# Patient Record
Sex: FEMALE | Race: WHITE | Employment: UNEMPLOYED | URBAN - METROPOLITAN AREA
[De-identification: names, ages, dates, MRNs, and addresses within clinical notes are randomized per-mention and may not be internally consistent; named-entity substitution may affect disease eponyms.]

---

## 2023-12-29 DIAGNOSIS — D32.9 MENINGIOMA (MULTI): ICD-10-CM

## 2024-01-26 ENCOUNTER — APPOINTMENT (OUTPATIENT)
Dept: HEMATOLOGY/ONCOLOGY | Facility: HOSPITAL | Age: 54
End: 2024-01-26
Payer: COMMERCIAL

## 2024-03-01 ENCOUNTER — TELEMEDICINE (OUTPATIENT)
Dept: HEMATOLOGY/ONCOLOGY | Facility: HOSPITAL | Age: 54
End: 2024-03-01
Payer: COMMERCIAL

## 2024-03-01 DIAGNOSIS — D32.9 MENINGIOMA (MULTI): ICD-10-CM

## 2024-03-01 PROCEDURE — 99215 OFFICE O/P EST HI 40 MIN: CPT | Performed by: PSYCHIATRY & NEUROLOGY

## 2024-03-01 NOTE — PROGRESS NOTES
Patient Visit Information:   Visit Type: New Visit      Cancer History:   Treatment Synopsis:       History of a meningioma diagnosed in 2007; surgery in 2020.         History of Present Illness:      ID Statement:    ETHEL PATE is a 53 year old Female        Chief Complaint: History of meningioma, diagnosed  2007, with surgery 2020.   Interval History:       Ethel Pate is a 52 yo RH  woman with a PMH significant for Multiple Sclerosis, who is here for a Return to Diving evaluation for the [facility]. She has a history of Multiple Sclerosis for many years (diagnosed in the 1990's),  and has been on various neuro-immunotherapy regimens and clinical trials. During routine MRI studies for the condition, a small tumor was discovered in the posterior right lateral ventricle in 2007, measuring roughly 3.0 cm. Since it was small and incidental,  and in a deep location, the local Neurosurgeon recommended it be followed with yearly imaging. The tumor remained very stable until an MRI in 2017 suggested some initial growth of the mass. It was then followed with more frequent scans (q3-4 months),  and had further mild growth in 2019/2020. So she was taken to the OR on 12/18/2020 for a gross total resection of the tumor, without any complications except for a persistent homonymous hemianopsia on the left side and some peritumoral ischemic changes.  The pathology was a WHO grade I meningioma. She went to rehab for 3 weeks before going home. She never had any seizures pre-op, during the surgery, or since then.      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 She has never had any complications from the diving, including DCS, DCI, or AGE. Her last diving was in 2018 when she was on the [place] [employer]. She has not done any diving since then, but is interested in trying to dive again in the future.      INTERVAL HISTORY (3/01/2024): Since the last  visit, she has been doing some pool diving with her local dive shop. She has been down < 10 feet for 2 dives so far, for > 90 minutes with minimal symptoms -- mainly trouble clearing her ears, slight headache.      Outpatient Medication Profile:  * Outpatient Medication Status not yet specified     Social History:   Social Substance History:  ·  Smoking Status never smoker   ·  Tobacco Use denies   ·  Alcohol Use occasionally   ·  Drug Use denies      Physical Exam:      Neurological: A&Ox3, speech fluent without dysnomia,  intact STM and cognition, loss of VF to the left, EOMI, PERRL, CN's II - XII otherwise intact, strength 5/5 in UE/LE, gait intact, DTR's 2-2+, sensation intact.         Radiology Result:     ·  Results           The local MRI scans are now uploaded into her chart, and show the tumor located in the right posterior atrium of the lateral ventricle, measuring close to 3.0 cm in size, with no edema in the nearby brain parenchyma. The post-op MRI scans from 2020 reveal  a GTR of the mass, with loss of brain tissue on the path to the tumor. On recent MRI and CT scans there was no hemorrhage or air in or near the resection cavity.            Assessment and Plan:      Assessment and Plan:   Assessment:       Ethel Pate has a history of Multiple Sclerosis and the recent resection of a meningioma, with questions about returning to scuba diving.         Plan:       -Ethel has a history of Multiple Sclerosis and a longstanding right sided meningioma that was resected back in late 2020.      -Other than a chronic visual field deficit, she is doing well and is neurologically intact and non-focal. She has NEVER HAD A SEIZURE.      -The CT and MRI scans were reviewed and showed a GTR of the tumor, with clean margins and no residual high signal/enhancement. NO AIR.       -By the end of the year it will be > 3 years since her brain tumor surgery.      -As planned, she has now started doing some limited  "shallow pool diving, and has been re-adjusting to breathing compressed air at depth just fine so far.      -We will now advance the depths to 15 feet for upcoming pool dives in March and April.     -She is also going to talk to the [employer] about some possible open water diving in the summer, which she would do in tandem with a Dive Instructor.      -She knows to call us if there are any new symptoms or issues when she is at depth during the pool sessions.      -She is to continue routine follow-up for the meningioma with her local Neurosurgeon.      -She is to continue routine follow-up for the Multiple Sclerosis with her local Neurologist.      -I spent > 30 minutes in Televideo consultation to review and discuss the above; 50% of which or more was dedicated to counseling.         Brian Dinh MD, DMO  PADI Instructor in Dive Medicine  [facility] Diving Medical Officer        Note Recipients: Unknown, Pcp, MD   Select \"Yes\" when ready to send to Provider(s) Listed Above:  Note sent to providers named above         Electronic Signatures:  Brian Dinh)  (Signed 01-Mar-2024 0910 am)            Authored: Patient Visit Information, Cancer History,  History of Present Illness, Allergies and Outpatient Medication Profile, Problem List, Social History, Physical Exam, Results, Assessment and Plan, To Send Document via Auto Fax, Attestation      "

## 2024-04-22 ENCOUNTER — TELEPHONE (OUTPATIENT)
Dept: HEMATOLOGY/ONCOLOGY | Facility: HOSPITAL | Age: 54
End: 2024-04-22
Payer: COMMERCIAL

## 2024-04-22 NOTE — TELEPHONE ENCOUNTER
Informed Ethel that Dr. Dinh would like her to continue her shallow dives in the pool and that she can go diving in [facility] under specific parameters:  -limit dive time to 45-60 mins  -remain at depth between 40-50ft, do not dive any deeper  -have a dive [place]  She is to call our office in the meantime if she has any issues.  Ehtel verbalized understanding of the instructions and is agreeable to the plan. She reports reaching out to [facility] and [facility] [facility] [employer] to see if they have suggestions on who she can work with that has experience with her situation.

## 2024-06-03 ENCOUNTER — TELEPHONE (OUTPATIENT)
Dept: HEMATOLOGY/ONCOLOGY | Facility: HOSPITAL | Age: 54
End: 2024-06-03
Payer: COMMERCIAL

## 2024-06-03 NOTE — TELEPHONE ENCOUNTER
Called to see if clearance letter needed a handwritten signature from Dr. Dinh.  Voicemail left instructing her to call us or to respond to our email chain.

## 2024-06-10 ENCOUNTER — TELEPHONE (OUTPATIENT)
Dept: HEMATOLOGY/ONCOLOGY | Facility: HOSPITAL | Age: 54
End: 2024-06-10

## 2024-09-06 ENCOUNTER — TELEMEDICINE (OUTPATIENT)
Dept: HEMATOLOGY/ONCOLOGY | Facility: HOSPITAL | Age: 54
End: 2024-09-06
Payer: COMMERCIAL

## 2024-09-06 DIAGNOSIS — D32.9 MENINGIOMA (MULTI): ICD-10-CM

## 2024-09-06 PROCEDURE — 99214 OFFICE O/P EST MOD 30 MIN: CPT | Performed by: PSYCHIATRY & NEUROLOGY

## 2024-09-06 NOTE — PATIENT INSTRUCTIONS
Your next appointment will be with Dr. Dinh in 3 months.   Please call scheduling to set up this appointment time.     Please call us with any questions or concerns at [phone] opt. 5, opt. 2  For scheduling concerns please call [phone] option 1

## 2024-09-06 NOTE — PROGRESS NOTES
Patient Visit Information:   Visit Type: New Visit      Cancer History:   Treatment Synopsis:       History of a meningioma diagnosed in 2007; surgery in 2020.         History of Present Illness:      ID Statement:    ETHEL PATE is a 53 year old Female        Chief Complaint: History of meningioma, diagnosed  2007, with surgery 2020.   Interval History:       Ethel Pate is a 52 yo RH  woman with a PMH significant for Multiple Sclerosis, who is here for a Return to Diving evaluation for the [facility]. She has a history of Multiple Sclerosis for many years (diagnosed in the 1990's),  and has been on various neuro-immunotherapy regimens and clinical trials. During routine MRI studies for the condition, a small tumor was discovered in the posterior right lateral ventricle in 2007, measuring roughly 3.0 cm. Since it was small and incidental,  and in a deep location, the local Neurosurgeon recommended it be followed with yearly imaging. The tumor remained very stable until an MRI in 2017 suggested some initial growth of the mass. It was then followed with more frequent scans (q3-4 months),  and had further mild growth in 2019/2020. So she was taken to the OR on 12/18/2020 for a gross total resection of the tumor, without any complications except for a persistent homonymous hemianopsia on the left side and some peritumoral ischemic changes.  The pathology was a WHO grade I meningioma. She went to rehab for 3 weeks before going home. She never had any seizures pre-op, during the surgery, or since then.      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 She has never had any complications from the diving, including DCS, DCI, or AGE. Her last diving was in 2018 when she was on the [place] [employer]. She has not done any diving since then, but is interested in trying to dive again in the future.      INTERVAL HISTORY (3/01/2024): Since the last  visit, she has been doing some pool diving with her local dive shop. She has been down < 10 feet for 2 dives so far, for > 90 minutes with minimal symptoms -- mainly trouble clearing her ears, slight headache.     INTERVAL HISTORY (9/06/2024): Since the last visit, she did more pool diving, and tolerated it well. She then went on a dive trip to [facility], where she had to dive in rough conditions, with 5-6 foot swells. She did several dives accompanied by a , no deeper than 50 fsw, which she tolerated well at depth; minimal trouble clearing her ears. However, she did have some nausea during ascent and when she was back on the boat. Otherwise, she did fine and has been feeling well.      Outpatient Medication Profile:  * Outpatient Medication Status not yet specified     Social History:   Social Substance History:  ·  Smoking Status never smoker   ·  Tobacco Use denies   ·  Alcohol Use occasionally   ·  Drug Use denies      Physical Exam:      Neurological: A&Ox3, speech fluent without dysnomia,  intact STM and cognition, loss of VF to the left, EOMI, PERRL, CN's II - XII otherwise intact, strength 5/5 in UE/LE, gait intact, DTR's 2-2+, sensation intact.         Radiology Result:     ·  Results        The local MRI scans are now uploaded into her chart, and show the tumor located in the right posterior atrium of the lateral ventricle, measuring close to 3.0 cm in size, with no edema in the nearby brain parenchyma. The post-op MRI scans from 2020 reveal  a GTR of the mass, with loss of brain tissue on the path to the tumor. On recent MRI and CT scans there was no hemorrhage or air in or near the resection cavity.         Assessment and Plan:   Assessment:       Ethel Pate has a history of Multiple Sclerosis and the recent resection of a meningioma, with questions about returning to scuba diving.         Plan:       -Ethel has a history of Multiple Sclerosis and a longstanding right sided  meningioma that was resected back in late 2020.      -Other than a chronic visual field deficit, she is doing well and is neurologically intact and non-focal. She has NEVER HAD A SEIZURE.      -The CT and MRI scans were reviewed and showed a GTR of the tumor, with clean margins and no residual high signal/enhancement. NO AIR.       -By the end of the year it will be > 3.5 years since her brain tumor surgery.      -As planned, she has done some shallow pool diving, and has been re-adjusting to breathing compressed air at depth just fine so far.      -Since the pool diving went well, she was cleared to go on the dive trip to [facility], where she did have some symptoms during ascent and on the boat -- most likely due to the rough sea conditions.     -She will be going on vacation to [place], [place] in October, and will do some limited diving there as well -- same parameters of no deeper than 50 fsw, with a .     -She is also going to talk to the [employer] about some possible open water diving in the summer, which she would do in tandem with a Dive Instructor.      -She knows to call us if there are any new symptoms or issues when she is at depth during the [place] dives.      -She is to continue routine follow-up for the meningioma with her local Neurosurgeon.      -She is to continue routine follow-up for the Multiple Sclerosis with her local Neurologist.      -I spent > 30 minutes in Televideo consultation to review and discuss the above; 50% of which or more was dedicated to counseling.         Brian Dinh MD, DMO  PADI Instructor in Dive Medicine  [facility] Diving Medical Officer  Dive Medicine Consultant, [facility]        Electronic Signatures:  Brian Dinh (MD)  (Signed 06-Sept-2024 1055 am)            Authored: Patient Visit Information, Cancer History,  History of Present Illness, Allergies and Outpatient Medication Profile, Problem List, Social History, Physical Exam, Results,  Assessment and Plan, To Send Document via Auto Fax, Attestation

## 2024-12-06 ENCOUNTER — APPOINTMENT (OUTPATIENT)
Dept: HEMATOLOGY/ONCOLOGY | Facility: HOSPITAL | Age: 54
End: 2024-12-06
Payer: COMMERCIAL